# Patient Record
Sex: FEMALE | Race: OTHER | HISPANIC OR LATINO | Employment: PART TIME | URBAN - METROPOLITAN AREA
[De-identification: names, ages, dates, MRNs, and addresses within clinical notes are randomized per-mention and may not be internally consistent; named-entity substitution may affect disease eponyms.]

---

## 2023-10-31 ENCOUNTER — OFFICE VISIT (OUTPATIENT)
Dept: UROLOGY | Facility: CLINIC | Age: 58
End: 2023-10-31
Payer: COMMERCIAL

## 2023-10-31 VITALS
BODY MASS INDEX: 21.66 KG/M2 | SYSTOLIC BLOOD PRESSURE: 122 MMHG | HEART RATE: 69 BPM | DIASTOLIC BLOOD PRESSURE: 73 MMHG | HEIGHT: 65 IN | WEIGHT: 130 LBS | TEMPERATURE: 96.9 F

## 2023-10-31 DIAGNOSIS — N39.0 RECURRENT UTI: ICD-10-CM

## 2023-10-31 DIAGNOSIS — N95.8 GENITOURINARY SYNDROME OF MENOPAUSE: Primary | ICD-10-CM

## 2023-10-31 PROCEDURE — 99204 OFFICE O/P NEW MOD 45 MIN: CPT | Performed by: NURSE PRACTITIONER

## 2023-10-31 PROCEDURE — 1036F TOBACCO NON-USER: CPT | Performed by: NURSE PRACTITIONER

## 2023-10-31 RX ORDER — METHOTREXATE 2.5 MG/1
TABLET ORAL
COMMUNITY
Start: 2023-09-11

## 2023-10-31 RX ORDER — ACETAMINOPHEN 500 MG
2000 TABLET ORAL DAILY
COMMUNITY

## 2023-10-31 RX ORDER — CYCLOSPORINE 0.5 MG/ML
1 EMULSION OPHTHALMIC EVERY 12 HOURS
COMMUNITY
Start: 2021-07-16

## 2023-10-31 RX ORDER — CEPHALEXIN 250 MG/1
CAPSULE ORAL
Qty: 30 CAPSULE | Refills: 2 | Status: SHIPPED | OUTPATIENT
Start: 2023-10-31

## 2023-10-31 RX ORDER — FOLIC ACID 1 MG/1
1 TABLET ORAL DAILY
COMMUNITY
Start: 2022-02-18

## 2023-10-31 RX ORDER — ESTRADIOL 0.1 MG/G
CREAM VAGINAL
Qty: 42.5 G | Refills: 3 | Status: SHIPPED | OUTPATIENT
Start: 2023-10-31 | End: 2024-10-30

## 2023-10-31 RX ORDER — LEFLUNOMIDE 20 MG/1
20 TABLET ORAL DAILY
COMMUNITY
Start: 2023-03-29

## 2023-10-31 NOTE — PATIENT INSTRUCTIONS
Low libido  Email resources [email or web address]  Estrogen cream pea size daily x 2 weeks, then m, w, f thereafter  Bonafide Hermanntella coupon given    Keflex 250 mg as needed w intercourse  Dmanose 2000 mg daily   Drink fluids    MARCUS    Follow up 4-6 pelvic exam  Nurse line [phone]

## 2023-10-31 NOTE — PROGRESS NOTES
"10/31/23   [id number]    Menopausal symptoms, UTIs, low libido      Subjective      HPI Brinda Raman is a 58 y.o. female who presents for menopausal symptoms.  Night sweats especially, day ok; Libido very low; Last menses 15 yrs ago. She did her own research, her friend was using [facility], she was using their creams, after a while stopped working; Saw GYN in [place] that worked w [employer]; Her family lives in [place], she also saw GYN in [place], she gave her vaginal estrogen, testosterone once weekly for libido; no longer using the cream as she has had 3 UTIs in past 3 mos. All r/t intimacy; Even on estrogen cream, still dry and painful;     DTF 10 x, NTF 1 x, often UUI, some JOELLE     37 yrs. 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; never used devices; has not orgasm lately;     PMH; Rheumatoid x 20 yrs,   PSH none  FH: aunt breast cancer, father lung cancer, no urological cancers  SH: never smoked,  [facility];                 Objective     /73   Pulse 69   Temp 36.1 °C (96.9 °F)   Ht 1.651 m (5' 5\")   Wt 59 kg (130 lb)   BMI 21.63 kg/m²    Physical Exam  General: Appears comfortable and in no apparent distress, well nourished  Head: Normocephalic, atraumatic  Neck: trachea midline  Respiratory: respirations unlabored, no wheezes, and no use of accessory muscles  Cardiovascular: at rest no dyspnea, well perfused  Skin: no visible rashes or lesions  Neurologic: grossly intact, oriented to person, place, and time  Psychiatric: mood and affect appropriate  Musculoskeletal: in chair for appt. no difficulty w upper body movement      Assessment/Plan   Problem List Items Addressed This Visit    None  Visit Diagnoses       Genitourinary syndrome of menopause    -  Primary    Relevant Medications    estradiol (Estrace) " "0.01 % (0.1 mg/gram) vaginal cream    Recurrent UTI        Relevant Medications    cephalexin (Keflex) 250 mg capsule    Other Relevant Orders    US renal complete          Orders Placed This Encounter   Procedures    US renal complete     Standing Status:   Future     Standing Expiration Date:   10/31/2024     Order Specific Question:   Reason for exam:     Answer:   recurrent UTI     Order Specific Question:   Radiologist to Determine Optimal Study     Answer:   Yes     Order Specific Question:   Release result to [place]     Answer:   Immediate     Order Specific Question:   Is this exam part of a Research Study? If Yes, link this order to the research study     Answer:   No      Low libido  Email resources [email or web address]  Estrogen cream pea size daily x 2 weeks, then m, w, f thereafter  Bonafide Ristella coupon given    Keflex 250 mg as needed w intercourse  Dmanose 2000 mg daily   Drink fluids    MARCUS    Follow up 4-6 pelvic exam  Nurse line [phone]       Laura Ann, APRN-CNP  No results found for: \"GLUCOSE\", \"CALCIUM\", \"NA\", \"K\", \"CO2\", \"CL\", \"BUN\", \"CREATININE\"    "

## 2023-11-08 ENCOUNTER — ANCILLARY PROCEDURE (OUTPATIENT)
Dept: RADIOLOGY | Facility: CLINIC | Age: 58
End: 2023-11-08
Payer: COMMERCIAL

## 2023-11-08 DIAGNOSIS — N39.0 RECURRENT UTI: ICD-10-CM

## 2023-11-08 PROCEDURE — 76770 US EXAM ABDO BACK WALL COMP: CPT

## 2023-11-08 PROCEDURE — 76770 US EXAM ABDO BACK WALL COMP: CPT | Performed by: RADIOLOGY

## 2023-11-10 ENCOUNTER — TELEPHONE (OUTPATIENT)
Dept: UROLOGY | Facility: CLINIC | Age: 58
End: 2023-11-10
Payer: COMMERCIAL

## 2023-11-10 NOTE — TELEPHONE ENCOUNTER
----- Message from FERNANDO Arreguin sent at 11/10/2023  6:37 AM EST -----  No stones, no mass, kidneys emptying appropriately, can further discuss at appt.  ----- Message -----  From: Ori, Radiology Results In  Sent: 11/9/2023   8:46 AM EST  To: FERNANDO Arreguin

## 2023-12-05 ENCOUNTER — OFFICE VISIT (OUTPATIENT)
Dept: UROLOGY | Facility: CLINIC | Age: 58
End: 2023-12-05
Payer: COMMERCIAL

## 2023-12-05 ENCOUNTER — TELEPHONE (OUTPATIENT)
Dept: UROLOGY | Facility: CLINIC | Age: 58
End: 2023-12-05

## 2023-12-05 VITALS
HEART RATE: 80 BPM | TEMPERATURE: 97.7 F | SYSTOLIC BLOOD PRESSURE: 123 MMHG | DIASTOLIC BLOOD PRESSURE: 74 MMHG | BODY MASS INDEX: 20.97 KG/M2 | WEIGHT: 126 LBS

## 2023-12-05 DIAGNOSIS — N39.0 RECURRENT UTI: Primary | ICD-10-CM

## 2023-12-05 DIAGNOSIS — R30.0 DYSURIA: ICD-10-CM

## 2023-12-05 DIAGNOSIS — N39.0 ACUTE UTI: ICD-10-CM

## 2023-12-05 DIAGNOSIS — N95.8 GENITOURINARY SYNDROME OF MENOPAUSE: ICD-10-CM

## 2023-12-05 LAB
APPEARANCE UR: CLEAR
BILIRUB UR STRIP.AUTO-MCNC: NEGATIVE MG/DL
COLOR UR: YELLOW
GLUCOSE UR STRIP.AUTO-MCNC: NEGATIVE MG/DL
KETONES UR STRIP.AUTO-MCNC: NEGATIVE MG/DL
LEUKOCYTE ESTERASE UR QL STRIP.AUTO: ABNORMAL
NITRITE UR QL STRIP.AUTO: NEGATIVE
PH UR STRIP.AUTO: 7 [PH]
POC APPEARANCE, URINE: CLEAR
POC BILIRUBIN, URINE: NEGATIVE
POC BLOOD, URINE: ABNORMAL
POC COLOR, URINE: YELLOW
POC GLUCOSE, URINE: NEGATIVE MG/DL
POC KETONES, URINE: NEGATIVE MG/DL
POC LEUKOCYTES, URINE: ABNORMAL
POC NITRITE,URINE: NEGATIVE
POC PH, URINE: 7 PH
POC PROTEIN, URINE: NEGATIVE MG/DL
POC SPECIFIC GRAVITY, URINE: 1.02
POC UROBILINOGEN, URINE: 0.2 EU/DL
PROT UR STRIP.AUTO-MCNC: NEGATIVE MG/DL
RBC # UR STRIP.AUTO: ABNORMAL /UL
RBC #/AREA URNS AUTO: ABNORMAL /HPF
SP GR UR STRIP.AUTO: 1.01
UROBILINOGEN UR STRIP.AUTO-MCNC: ABNORMAL MG/DL
WBC #/AREA URNS AUTO: >50 /HPF

## 2023-12-05 PROCEDURE — 87086 URINE CULTURE/COLONY COUNT: CPT

## 2023-12-05 PROCEDURE — 81003 URINALYSIS AUTO W/O SCOPE: CPT | Performed by: NURSE PRACTITIONER

## 2023-12-05 PROCEDURE — 99214 OFFICE O/P EST MOD 30 MIN: CPT | Performed by: NURSE PRACTITIONER

## 2023-12-05 PROCEDURE — 1036F TOBACCO NON-USER: CPT | Performed by: NURSE PRACTITIONER

## 2023-12-05 PROCEDURE — 81001 URINALYSIS AUTO W/SCOPE: CPT

## 2023-12-05 RX ORDER — TRIMETHOPRIM 100 MG/1
TABLET ORAL
Qty: 30 TABLET | Refills: 5 | Status: SHIPPED | OUTPATIENT
Start: 2023-12-05

## 2023-12-05 RX ORDER — SULFAMETHOXAZOLE AND TRIMETHOPRIM 800; 160 MG/1; MG/1
1 TABLET ORAL 2 TIMES DAILY
Qty: 14 TABLET | Refills: 0 | Status: SHIPPED | OUTPATIENT
Start: 2023-12-05 | End: 2023-12-12

## 2023-12-05 NOTE — PROGRESS NOTES
12/05/23   [id number]    UTI symptoms menopausal symptoms, low libido,      Subjective      HPI Brinda Raman is a 58 y.o. female who presents for follow up pelvic exam but w UTI held off on exam today; follow up menopausal symptoms, low libido as well;  Allergic reaction to keflex caused rash; took prednisone for a week, face and neck peeling, saw Dermatologist; anxious about antibiotics now;     Purchased vibrator; happy to finally able to have orgasm but then developed UTI; UA looks infected today; used the vaginal estrogen cream but on the outside not at urethra; started the Dmanose 2000 mg daily; drinking fluids;     Forgot to start the Bonafide Ristella     MARCUS 11/9/23 no stone, no mass, no hydro, normal US.    No hematuria, no fever, chills or back pain; patient able to get list of abx taken in past at [facility] without difficulty;      PMH; Rheumatoid x 20 yrs,   PSH none  FH: aunt breast cancer, father lung cancer, no urological cancers  SH: never smoked,  [place];     Objective     /74   Pulse 80   Temp 36.5 °C (97.7 °F)   Wt 57.2 kg (126 lb)   BMI 20.97 kg/m²    Physical Exam    General: Appears comfortable and in no apparent distress, well nourished  Head: Normocephalic, atraumatic  Neck: trachea midline  Respiratory: respirations unlabored, no wheezes, and no use of accessory muscles  Cardiovascular: at rest no dyspnea, well perfused  Skin: no visible rashes or lesions  Neurologic: grossly intact, oriented to person, place, and time  Psychiatric: mood and affect appropriate  Musculoskeletal: in chair for appt. no difficulty w upper body movement    Assessment/Plan   Problem List Items Addressed This Visit    None  Visit Diagnoses       Recurrent UTI    -  Primary    Relevant Medications    trimethoprim (Trimpex) 100 mg tablet    Other Relevant Orders    POCT UA Automated manually resulted (Completed)    Urinalysis with Reflex Microscopic    Genitourinary syndrome of menopause      "   Relevant Orders    Urine culture    Dysuria        Relevant Orders    Urine culture    Urinalysis with Reflex Microscopic    Acute UTI        Relevant Medications    sulfamethoxazole-trimethoprim (Bactrim DS) 800-160 mg tablet          Orders Placed This Encounter   Procedures    Urine culture     Standing Status:   Future     Number of Occurrences:   1     Standing Expiration Date:   12/12/2023     Order Specific Question:   Release result to [facility]     Answer:   Immediate [1]    Urinalysis with Reflex Microscopic     Standing Status:   Future     Number of Occurrences:   1     Standing Expiration Date:   12/5/2024     Order Specific Question:   [facility]     Answer:   Immediate [1]    POCT UA Automated manually resulted     Order Specific Question:   Release result to [facility]     Answer:   Immediate [1]      Recurrent UTI  Bactrim for UTI (taken at [facility] and tolerated well)  Discussed if tolerated well would use trimethoprim w intercourse low dose  Estrogen cream pea size amount rub into urethral area w finger 3 x per week  Uber lube    Low libido  Taking care of UTI w intercourse  Ristella coupon given    Plan:   Reschedule for pelvic exam in Jan w her schedule  Nurse vignesh [phone]                 Laura Ann, APRN-CNP  No results found for: \"GLUCOSE\", \"CALCIUM\", \"NA\", \"K\", \"CO2\", \"CL\", \"BUN\", \"CREATININE\"    "

## 2023-12-05 NOTE — TELEPHONE ENCOUNTER
GE pharmacist called and said that both the Bactrim and Trimethaprim that were sent over today have a severe interaction with the Methotrexate that the pt has been on for years and asking for different abx to be sent in. Please advise, thanks.

## 2023-12-05 NOTE — PATIENT INSTRUCTIONS
Recurrent UTI  Bactrim for UTI (taken at [facility] and tolerated well)  Discussed if tolerated well would use trimethoprim w intercourse low dose  Estrogen cream pea size amount rub into urethral area w finger 3 x per week  Uber lube    Low libido  Taking care of UTI w intercourse  Aftab ashley given    Plan:   Reschedule for pelvic exam in Jan w her schedule  Nurse vignesh [phone]

## 2023-12-05 NOTE — TELEPHONE ENCOUNTER
Per Laura verbal, pt rheumatologist already aware and pt does not take the methotrexate if she is currently on the abx. Pharmacist informed.

## 2023-12-06 LAB — BACTERIA UR CULT: NO GROWTH

## 2023-12-07 ENCOUNTER — TELEPHONE (OUTPATIENT)
Dept: UROLOGY | Facility: CLINIC | Age: 58
End: 2023-12-07
Payer: COMMERCIAL

## 2023-12-07 DIAGNOSIS — N30.01 ACUTE CYSTITIS WITH HEMATURIA: Primary | ICD-10-CM

## 2023-12-07 NOTE — TELEPHONE ENCOUNTER
----- Message from FERNANDO Arreguin sent at 12/7/2023  8:10 AM EST -----  I Called left complex vm, microscopy hematuria normal range but did appear infected, curious if she took single dose of abx she maybe had at home prior to sending the culture? Requested patient call back w more information.  ----- Message -----  From: Latanya Rosenthal MA  Sent: 12/5/2023   8:23 AM EST  To: FERNANDO Arreguin

## 2023-12-07 NOTE — TELEPHONE ENCOUNTER
Spoke with pt who states she did take an Azo the day of the urine culture. Pt is on Bactrim now and is going to [place] for the holidays on the 17th and was asking if she can drop off another urine after she completes the Bactrim just to make sure it is better before she leaves the country. Orders placed in chart and informed her we will call her when we get those results.

## 2023-12-08 DIAGNOSIS — N39.0 ACUTE UTI: Primary | ICD-10-CM

## 2023-12-08 RX ORDER — NITROFURANTOIN 25; 75 MG/1; MG/1
100 CAPSULE ORAL 2 TIMES DAILY
Qty: 14 CAPSULE | Refills: 0 | Status: SHIPPED | OUTPATIENT
Start: 2023-12-08 | End: 2023-12-15

## 2024-02-06 ENCOUNTER — APPOINTMENT (OUTPATIENT)
Dept: UROLOGY | Facility: CLINIC | Age: 59
End: 2024-02-06
Payer: COMMERCIAL

## 2024-02-08 ENCOUNTER — TELEPHONE (OUTPATIENT)
Dept: UROLOGY | Facility: CLINIC | Age: 59
End: 2024-02-08
Payer: COMMERCIAL

## 2024-02-08 ENCOUNTER — LAB (OUTPATIENT)
Dept: LAB | Facility: LAB | Age: 59
End: 2024-02-08
Payer: COMMERCIAL

## 2024-02-08 DIAGNOSIS — R30.0 DYSURIA: ICD-10-CM

## 2024-02-08 DIAGNOSIS — N30.00 ACUTE CYSTITIS WITHOUT HEMATURIA: Primary | ICD-10-CM

## 2024-02-08 DIAGNOSIS — N30.01 ACUTE CYSTITIS WITH HEMATURIA: ICD-10-CM

## 2024-02-08 DIAGNOSIS — R30.0 DYSURIA: Primary | ICD-10-CM

## 2024-02-08 LAB
BACTERIA #/AREA URNS AUTO: ABNORMAL /HPF
RBC #/AREA URNS AUTO: ABNORMAL /HPF
WBC #/AREA URNS AUTO: ABNORMAL /HPF

## 2024-02-08 PROCEDURE — 87086 URINE CULTURE/COLONY COUNT: CPT

## 2024-02-08 PROCEDURE — 81001 URINALYSIS AUTO W/SCOPE: CPT

## 2024-02-08 RX ORDER — NITROFURANTOIN 25; 75 MG/1; MG/1
100 CAPSULE ORAL 2 TIMES DAILY
Qty: 14 CAPSULE | Refills: 0 | Status: SHIPPED | OUTPATIENT
Start: 2024-02-08 | End: 2024-02-15

## 2024-02-08 NOTE — TELEPHONE ENCOUNTER
Spoke with pt who states she was only taking the Trimethoprim 100mg the last 3 nights because she had intercourse but she is still feeling symptomatic so she is requesting new, stronger abx be sent in. Orders placed in chart and she is dropping a urine off tomorrow morning at the [facility] location. I informed her not to start any new abx, even if they get sent in today from Laura until after she drops the urine off in the morning. Please advise, thanks.

## 2024-02-08 NOTE — TELEPHONE ENCOUNTER
Pt left message stating her UTI sx have returned so she took 3 days worth of the abx that were previously ordered for her as she was beginning to feel better but now the sx are back again so she has re-started taking the abx but does not have many left and is asking if we can send in some more. Please advise, thanks.

## 2024-02-09 LAB — BACTERIA UR CULT: NORMAL

## 2024-02-12 ENCOUNTER — TELEPHONE (OUTPATIENT)
Dept: UROLOGY | Facility: CLINIC | Age: 59
End: 2024-02-12
Payer: COMMERCIAL

## 2024-02-12 NOTE — TELEPHONE ENCOUNTER
----- Message from FERNANDO Arreguin sent at 2/12/2024  9:22 AM EST -----  No infection. If still sx let's have appt. Can be virtual to discuss options.  ----- Message -----  From: Lab, Background User  Sent: 2/8/2024   7:46 PM EST  To: FERNANDO Arreguin

## 2024-03-19 ENCOUNTER — APPOINTMENT (OUTPATIENT)
Dept: UROLOGY | Facility: CLINIC | Age: 59
End: 2024-03-19
Payer: COMMERCIAL

## 2024-04-15 ENCOUNTER — APPOINTMENT (OUTPATIENT)
Dept: UROLOGY | Facility: CLINIC | Age: 59
End: 2024-04-15
Payer: COMMERCIAL

## 2024-04-17 ENCOUNTER — OFFICE VISIT (OUTPATIENT)
Dept: UROLOGY | Facility: CLINIC | Age: 59
End: 2024-04-17
Payer: COMMERCIAL

## 2024-04-17 VITALS
HEIGHT: 66 IN | BODY MASS INDEX: 19.77 KG/M2 | TEMPERATURE: 97.5 F | SYSTOLIC BLOOD PRESSURE: 118 MMHG | DIASTOLIC BLOOD PRESSURE: 76 MMHG | WEIGHT: 123 LBS | HEART RATE: 87 BPM

## 2024-04-17 DIAGNOSIS — F52.31 FEMALE ORGASMIC DISORDER: ICD-10-CM

## 2024-04-17 DIAGNOSIS — N39.0 RECURRENT UTI: Primary | ICD-10-CM

## 2024-04-17 DIAGNOSIS — F52.0 DECREASED SEXUAL DESIRE: ICD-10-CM

## 2024-04-17 DIAGNOSIS — N95.8 GENITOURINARY SYNDROME OF MENOPAUSE: ICD-10-CM

## 2024-04-17 LAB
POC APPEARANCE, URINE: CLEAR
POC BILIRUBIN, URINE: NEGATIVE
POC BLOOD, URINE: ABNORMAL
POC COLOR, URINE: YELLOW
POC GLUCOSE, URINE: NEGATIVE MG/DL
POC KETONES, URINE: NEGATIVE MG/DL
POC LEUKOCYTES, URINE: NEGATIVE
POC NITRITE,URINE: NEGATIVE
POC PH, URINE: 7 PH
POC PROTEIN, URINE: NEGATIVE MG/DL
POC SPECIFIC GRAVITY, URINE: 1.01
POC UROBILINOGEN, URINE: 0.2 EU/DL

## 2024-04-17 PROCEDURE — 81003 URINALYSIS AUTO W/O SCOPE: CPT | Performed by: NURSE PRACTITIONER

## 2024-04-17 PROCEDURE — 99214 OFFICE O/P EST MOD 30 MIN: CPT | Performed by: NURSE PRACTITIONER

## 2024-04-17 PROCEDURE — 1036F TOBACCO NON-USER: CPT | Performed by: NURSE PRACTITIONER

## 2024-04-17 NOTE — PATIENT INSTRUCTIONS
Favorite resources  [email or web address]    Testosterone cream for clitoris  Testosterone level6-8 weeks after starting    Uber lube    Continue Ristella for now, discuss Addyi if no improvement w testosteron    UTI prevention continue    Nurse line [phone]

## 2024-04-17 NOTE — PROGRESS NOTES
"04/17/24   [id number]    FSD, UTIs, pelvic exam     Subjective      HPI Brinda Raman is a 59 y.o. female who presents for follow up FSD, UTIs, pelvic exam;     No desire; used biodenticals until past; > 10 yrs since transitioned from menopause;    The desire and orgasm, ristella helped with that she no longer feels like she doesn't want  to touch her; able to have orgasm twice prior to last visit;     Not using vibrator, never masturbated;      No improvement w Ristella w low desire;     PMH; Rheumatoid x 20 yrs,   PSH none  FH: aunt breast cancer, father lung cancer, no urological cancers  SH: never smoked,  [place];       MARCUS 11/9/23 no stone, no mass, no hydro, normal US.      Objective     /76   Pulse 87   Temp 36.4 °C (97.5 °F)   Ht 1.676 m (5' 6\")   Wt 55.8 kg (123 lb)   BMI 19.85 kg/m²    Physical Exam  Genitourinary:     Comments: No vulvar lesions, neg Qtip tenderness, mod atrophy  Clitoris somewhat blunted response decipher hard from soft w qtip,  (neg) bulbocavernous reflex noted;   Neg CST lying down, Neg levator ani tenderness or tightness  No cystocele, no rectocele or uterine prolapse  Non tender ovaries/uterus, difficult exam d/t  body habitus   No rectal exam done        General: Appears comfortable and in no apparent distress, well nourished  Head: Normocephalic, atraumatic  Neck: trachea midline  Respiratory: respirations unlabored, no wheezes, and no use of accessory muscles  Cardiovascular: at rest no dyspnea, well perfused  Skin: no visible rashes or lesions  Neurologic: grossly intact, oriented to person, place, and time  Psychiatric: mood and affect appropriate  Musculoskeletal: in chair for appt. no difficulty w upper body movement      Assessment/Plan   Problem List Items Addressed This Visit    None  Visit Diagnoses       Recurrent UTI    -  Primary    Relevant Orders    POCT UA Automated manually resulted (Completed)    Post-Void Residual (Completed) " "   Genitourinary syndrome of menopause        Female orgasmic disorder        Relevant Orders    Testosterone    Decreased sexual desire              Orders Placed This Encounter   Procedures    Post-Void Residual    Testosterone     Standing Status:   Future     Standing Expiration Date:   4/17/2025     Order Specific Question:   Release result to [employer]     Answer:   Immediate [1]    POCT UA Automated manually resulted     Order Specific Question:   Release result to [employer]     Answer:   Immediate [1]      Favorite resources  [email or web address]    Testosterone cream for clitoris  Testosterone level6-8 weeks after starting    Uber lube    Continue Ristella for now, discuss Addyi if no improvement w testosteron    UTI prevention continue    Nurse line [phone]     3 mos virtual follow up Friday morning, low desire  Laura Ann, APRN-CNP  No results found for: \"GLUCOSE\", \"CALCIUM\", \"NA\", \"K\", \"CO2\", \"CL\", \"BUN\", \"CREATININE\"    "

## 2024-07-19 ENCOUNTER — APPOINTMENT (OUTPATIENT)
Dept: UROLOGY | Facility: CLINIC | Age: 59
End: 2024-07-19
Payer: COMMERCIAL

## 2024-07-19 DIAGNOSIS — N39.0 RECURRENT UTI: ICD-10-CM

## 2024-07-19 DIAGNOSIS — N95.8 GENITOURINARY SYNDROME OF MENOPAUSE: ICD-10-CM

## 2024-07-19 DIAGNOSIS — F52.0 HYPOACTIVE SEXUAL DESIRE: Primary | ICD-10-CM

## 2024-07-19 DIAGNOSIS — F52.31 FEMALE ORGASMIC DISORDER: ICD-10-CM

## 2024-07-19 PROCEDURE — 99213 OFFICE O/P EST LOW 20 MIN: CPT | Performed by: NURSE PRACTITIONER

## 2024-07-19 RX ORDER — FLIBANSERIN 100 MG/1
100 TABLET, FILM COATED ORAL NIGHTLY
Qty: 30 TABLET | Refills: 11 | Status: SHIPPED | OUTPATIENT
Start: 2024-07-19 | End: 2025-07-19

## 2024-07-19 NOTE — PROGRESS NOTES
07/19/24   [id number]    FSD, UTIs     Subjective      HPI Brinda Raman is a 59 y.o. female who presents for follow up FSD, UTIs;    Last seen 4/17/24    Creatinine 0.54,     No UTIs since last seen April 4/17/24;   Estrogen cream, no longer using trimethoprim w intercourse; using uber lube, feels that helped    Using testosterone cream for clitoris and Ristella  Everything is so much better; but was thinking about hormones again to see if that would help systemically;    Went through menopause 15 yrs, used bioidentical hormones (estrogen/testosterone cream) until last summer;     History reviewed  PMH; Rheumatoid x 20 yrs,   PSH none  FH: aunt breast cancer, father lung cancer, no urological cancers  SH: never smoked,  [place];       MARCUS 11/9/23 no stone, no mass, no hydro, normal US.      Objective     There were no vitals taken for this visit.   Physical Exam  General: Appears comfortable and in no apparent distress, well nourished  Head: Normocephalic, atraumatic  Neck: trachea midline  Respiratory: respirations unlabored, no wheezes, and no use of accessory muscles  Cardiovascular: at rest no dyspnea, well perfused  Skin: no visible rashes or lesions  Neurologic: grossly intact, oriented to person, place, and time  Psychiatric: mood and affect appropriate  Musculoskeletal: in chair for appt. 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     Addyi pill daily, discussed b/r/a  [facility] [place]    Testosterone cream for clitoris  Testosterone level6-8 weeks after starting    Uber lube    UTI prevention continue    Nurse line [phone]    "  3 mos virtual follow up Friday morning, low desire  Laura Ann, APRN-CNP  No results found for: \"GLUCOSE\", \"CALCIUM\", \"NA\", \"K\", \"CO2\", \"CL\", \"BUN\", \"CREATININE\"    "

## 2024-10-25 ENCOUNTER — APPOINTMENT (OUTPATIENT)
Dept: UROLOGY | Facility: CLINIC | Age: 59
End: 2024-10-25
Payer: COMMERCIAL

## 2024-10-25 DIAGNOSIS — N95.8 GENITOURINARY SYNDROME OF MENOPAUSE: Primary | ICD-10-CM

## 2024-10-25 DIAGNOSIS — N94.10 DYSPAREUNIA, FEMALE: ICD-10-CM

## 2024-10-25 PROCEDURE — 99213 OFFICE O/P EST LOW 20 MIN: CPT | Performed by: NURSE PRACTITIONER

## 2024-10-25 RX ORDER — PRASTERONE 6.5 MG/1
6.5 INSERT VAGINAL DAILY
Qty: 28 EACH | Refills: 11 | Status: SHIPPED | OUTPATIENT
Start: 2024-10-25 | End: 2025-10-25

## 2024-10-25 NOTE — PROGRESS NOTES
10/25/24   [id number]    FSD, UTIs    Virtual or Telephone Consent    An interactive audio and video telecommunication system which permits real time communications between the patient (at the originating site) and provider (at the distant site) was utilized to provide this telehealth service.   Verbal consent was requested and obtained from Brinda Raman on this date, 10/25/24 for a telehealth visit.        Subjective      HPI Brinda Raman is a 59 y.o. female who presents for follow up FSD, UTIs;    Last seen 7/19/24    No UTIs since last seen; didn't  the Addyi as so $$; feels what she is doing right now is working; using the testosterone cream for clitoris; using the bonafide (cut in half);     Since last visit, was spotting after intercourse, biopsy of endometrium normal; using the estrogen cream; no longer needs the low dose antibiotic with intercourse past 6 mos;     Libido and everything has been good;   Using testosterone cream for clitoris, no side effects; will go get testosterone level;     Ristella working well;     Creatinine 0.54,     History reviewed  PMH; Rheumatoid x 20 yrs,   PSH none  FH: aunt breast cancer, father lung cancer, no urological cancers  [facility];     Went through menopause 15 yrs, used bioidentical hormones (estrogen/testosterone cream) until last summer;     MARCUS 11/9/23 no stone, no mass, no hydro, normal US.    Creatinine 0.66,     Objective     There were no vitals taken for this visit.   Physical Exam  General: Appears comfortable and in no apparent distress, well nourished  Head: Normocephalic, atraumatic  Neck: trachea midline  Respiratory: respirations unlabored, no wheezes, and no use of accessory muscles  Cardiovascular: at rest no dyspnea, well perfused  Skin: no visible rashes or lesions  Neurologic: grossly intact, oriented to person, place, and time  Psychiatric: mood and affect appropriate  Musculoskeletal: in  "chair for appt. no difficulty w upper body movement      Assessment/Plan   Problem List Items Addressed This Visit    None  Visit Diagnoses       Genitourinary syndrome of menopause    -  Primary    Relevant Medications    prasterone, dhea, (Intrarosa) 6.5 mg insert    Dyspareunia, female        Relevant Medications    prasterone, dhea, (Intrarosa) 6.5 mg insert              No orders of the defined types were placed in this encounter.     Plan:   Intrarosa   Has failed estrogen vaginal cream, still dry    Using testosterone on clitoris, if starts intrarosa may no longer need the testosterone cream     Has blood work order for testosterone    Doing well with UTIs    Nurse line [phone]     6 mos virtual follow up Friday morning, low desire  Laura Ann, APRN-CNP  No results found for: \"GLUCOSE\", \"CALCIUM\", \"NA\", \"K\", \"CO2\", \"CL\", \"BUN\", \"CREATININE\"    "

## 2024-10-29 ENCOUNTER — TELEPHONE (OUTPATIENT)
Dept: UROLOGY | Facility: CLINIC | Age: 59
End: 2024-10-29
Payer: COMMERCIAL

## 2025-01-16 ENCOUNTER — APPOINTMENT (OUTPATIENT)
Dept: ENDOCRINOLOGY | Facility: CLINIC | Age: 60
End: 2025-01-16
Payer: COMMERCIAL

## 2025-04-25 ENCOUNTER — APPOINTMENT (OUTPATIENT)
Dept: UROLOGY | Facility: CLINIC | Age: 60
End: 2025-04-25
Payer: COMMERCIAL

## 2025-04-25 DIAGNOSIS — N94.10 DYSPAREUNIA, FEMALE: ICD-10-CM

## 2025-04-25 DIAGNOSIS — F52.31 FEMALE ORGASMIC DISORDER: ICD-10-CM

## 2025-04-25 DIAGNOSIS — F52.0 HYPOACTIVE SEXUAL DESIRE: Primary | ICD-10-CM

## 2025-04-25 DIAGNOSIS — N39.0 RECURRENT UTI: ICD-10-CM

## 2025-04-25 DIAGNOSIS — F52.0 DECREASED SEXUAL DESIRE: ICD-10-CM

## 2025-04-25 DIAGNOSIS — N95.8 GENITOURINARY SYNDROME OF MENOPAUSE: ICD-10-CM

## 2025-04-25 PROCEDURE — 99213 OFFICE O/P EST LOW 20 MIN: CPT | Performed by: NURSE PRACTITIONER

## 2025-04-25 RX ORDER — ESTRADIOL 0.1 MG/G
CREAM VAGINAL
Qty: 42.5 G | Refills: 3 | Status: SHIPPED | OUTPATIENT
Start: 2025-04-25 | End: 2026-04-25

## 2025-04-25 RX ORDER — SILDENAFIL 25 MG/1
TABLET, FILM COATED ORAL
Qty: 10 TABLET | Refills: 3 | Status: SHIPPED | OUTPATIENT
Start: 2025-04-25

## 2025-04-25 NOTE — PROGRESS NOTES
04/25/25   [id number]    FSD, UTIs    Virtual or Telephone Consent    An interactive audio and video telecommunication system which permits real time communications between the patient (at the originating site) and provider (at the distant site) was utilized to provide this telehealth service.   Verbal consent was requested and obtained from Brinda Raman on this date, 04/25/25 for a telehealth visit.        Subjective      HPI Brinda Raman is a 60 y.o. female who presents for follow up FSD, UTIs;    Last seen 10/25/24    Never started the Addyi pill after discussing with ;   Using the testosterone and bonafide and feels that helping with estradiol two times per week; no clitormegaly; would like to try the viagra as well.     No UTIs since last seen; didn't  the Addyi as so $$; feels what she is doing right now is working; using the testosterone cream for clitoris; using the bonafide (cut in half);     No UTIs since seen as well, happy with results.    80% better overall!!    Libido and everything has been good;   Using testosterone cream for clitoris, no side effects; will go get testosterone level;     Ristella working well; still using! Using liquid form now; has helped so much;     Creatinine 0.54,     History reviewed  PMH; Rheumatoid x 20 yrs,   PSH none  FH: aunt breast cancer, father lung cancer, no urological cancers  [facility] never [facility];     Went through menopause 15 yrs, used bioidentical hormones (estrogen/testosterone cream) until last summer;     MARCUS 11/9/23 no stone, no mass, no hydro, normal US.    Creatinine 0.66,     Objective     There were no vitals taken for this visit.   Physical Exam  General: Appears comfortable and in no apparent distress, well nourished  Head: Normocephalic, atraumatic  Neck: trachea midline  Respiratory: respirations unlabored, no wheezes, and no use of accessory muscles  Cardiovascular: at rest no dyspnea, well  "perfused  Skin: no visible rashes or lesions  Neurologic: grossly intact, oriented to person, place, and time  Psychiatric: mood and affect appropriate  Musculoskeletal: in chair for appt. 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          Decreased sexual desire                    Orders Placed This Encounter   Procedures    Testosterone, free, total     Standing Status:   Future     Number of Occurrences:   1     Expected Date:   4/25/2025     Expiration Date:   4/25/2026     [facility]   Immediate [1]      Plan:   Estradiol cream twice weekly, needs Rx  Using testosterone on clitoris, if starts intrarosa may no longer need the testosterone cream     Ristella continues  Trial viagra 12.5 mg an hour before intimacy    Has blood work order for testosterone    Doing well with UTIs    Nurse line [phone]     [facility] mos [facility] at [facility] assess clitoris, follow up testosterone, med response  Laura Ann, APRN-CNP  No results found for: \"GLUCOSE\", \"CALCIUM\", \"NA\", \"K\", \"CO2\", \"CL\", \"BUN\", \"CREATININE\"    "

## 2025-05-30 LAB
TESTOST FREE SERPL-MCNC: 0.5 PG/ML (ref 0.1–6.4)
TESTOST SERPL-MCNC: 10 NG/DL (ref 2–45)

## 2025-07-28 ENCOUNTER — APPOINTMENT (OUTPATIENT)
Dept: UROLOGY | Facility: CLINIC | Age: 60
End: 2025-07-28
Payer: COMMERCIAL

## 2025-07-29 ENCOUNTER — APPOINTMENT (OUTPATIENT)
Dept: UROLOGY | Facility: CLINIC | Age: 60
End: 2025-07-29
Payer: COMMERCIAL

## 2025-07-30 ENCOUNTER — APPOINTMENT (OUTPATIENT)
Dept: UROLOGY | Facility: CLINIC | Age: 60
End: 2025-07-30
Payer: COMMERCIAL

## 2025-08-11 ENCOUNTER — APPOINTMENT (OUTPATIENT)
Dept: UROLOGY | Facility: CLINIC | Age: 60
End: 2025-08-11
Payer: COMMERCIAL

## 2025-08-11 VITALS
WEIGHT: 128 LBS | SYSTOLIC BLOOD PRESSURE: 125 MMHG | TEMPERATURE: 97.1 F | BODY MASS INDEX: 20.57 KG/M2 | HEART RATE: 84 BPM | DIASTOLIC BLOOD PRESSURE: 74 MMHG | HEIGHT: 66 IN

## 2025-08-11 DIAGNOSIS — F52.31 FEMALE ORGASMIC DISORDER: ICD-10-CM

## 2025-08-11 DIAGNOSIS — F52.0 HYPOACTIVE SEXUAL DESIRE: ICD-10-CM

## 2025-08-11 DIAGNOSIS — N94.10 DYSPAREUNIA, FEMALE: ICD-10-CM

## 2025-08-11 DIAGNOSIS — N39.0 RECURRENT UTI: Primary | ICD-10-CM

## 2025-08-11 DIAGNOSIS — N95.8 GENITOURINARY SYNDROME OF MENOPAUSE: ICD-10-CM

## 2025-08-11 DIAGNOSIS — Z13.220 SCREENING CHOLESTEROL LEVEL: ICD-10-CM

## 2025-08-11 LAB
POC APPEARANCE, URINE: CLEAR
POC BILIRUBIN, URINE: NEGATIVE
POC BLOOD, URINE: NEGATIVE
POC COLOR, URINE: YELLOW
POC GLUCOSE, URINE: NEGATIVE MG/DL
POC KETONES, URINE: NEGATIVE MG/DL
POC LEUKOCYTES, URINE: NEGATIVE
POC NITRITE,URINE: NEGATIVE
POC PH, URINE: 7.5 PH
POC PROTEIN, URINE: NEGATIVE MG/DL
POC SPECIFIC GRAVITY, URINE: 1.01
POC UROBILINOGEN, URINE: 0.2 EU/DL

## 2025-08-11 PROCEDURE — 3008F BODY MASS INDEX DOCD: CPT | Performed by: NURSE PRACTITIONER

## 2025-08-11 PROCEDURE — 81003 URINALYSIS AUTO W/O SCOPE: CPT | Performed by: NURSE PRACTITIONER

## 2025-08-11 PROCEDURE — 99214 OFFICE O/P EST MOD 30 MIN: CPT | Performed by: NURSE PRACTITIONER

## 2025-08-11 PROCEDURE — 1036F TOBACCO NON-USER: CPT | Performed by: NURSE PRACTITIONER

## 2025-08-11 ASSESSMENT — PATIENT HEALTH QUESTIONNAIRE - PHQ9
SUM OF ALL RESPONSES TO PHQ9 QUESTIONS 1 AND 2: 0
2. FEELING DOWN, DEPRESSED OR HOPELESS: NOT AT ALL
1. LITTLE INTEREST OR PLEASURE IN DOING THINGS: NOT AT ALL

## 2025-08-12 LAB
CHOLEST SERPL-MCNC: 181 MG/DL
CHOLEST/HDLC SERPL: 2.2 (CALC)
HDLC SERPL-MCNC: 83 MG/DL
LDLC SERPL CALC-MCNC: 84 MG/DL (CALC)
NONHDLC SERPL-MCNC: 98 MG/DL (CALC)
TRIGL SERPL-MCNC: 65 MG/DL

## 2025-08-13 DIAGNOSIS — N95.1 VASOMOTOR SYMPTOMS DUE TO MENOPAUSE: Primary | ICD-10-CM

## 2025-08-13 RX ORDER — ESTRADIOL 0.03 MG/D
1 FILM, EXTENDED RELEASE TRANSDERMAL 2 TIMES WEEKLY
Qty: 24 PATCH | Refills: 3 | Status: SHIPPED | OUTPATIENT
Start: 2025-08-14 | End: 2026-08-14

## 2025-08-13 RX ORDER — PROGESTERONE 100 MG/1
100 CAPSULE ORAL NIGHTLY
Qty: 90 CAPSULE | Refills: 3 | Status: SHIPPED | OUTPATIENT
Start: 2025-08-13 | End: 2026-08-13

## 2026-01-09 ENCOUNTER — APPOINTMENT (OUTPATIENT)
Dept: UROLOGY | Facility: CLINIC | Age: 61
End: 2026-01-09
Payer: COMMERCIAL